# Patient Record
Sex: MALE | Race: WHITE
[De-identification: names, ages, dates, MRNs, and addresses within clinical notes are randomized per-mention and may not be internally consistent; named-entity substitution may affect disease eponyms.]

---

## 2017-05-12 ENCOUNTER — HOSPITAL ENCOUNTER (EMERGENCY)
Dept: HOSPITAL 17 - NEPD | Age: 42
LOS: 1 days | Discharge: PSYCHIATRIC OR SUBSTANCE USE FACILITY | End: 2017-05-13
Payer: SELF-PAY

## 2017-05-12 VITALS — HEIGHT: 69 IN | BODY MASS INDEX: 20.57 KG/M2 | WEIGHT: 138.89 LBS

## 2017-05-12 VITALS
SYSTOLIC BLOOD PRESSURE: 110 MMHG | HEART RATE: 94 BPM | OXYGEN SATURATION: 99 % | DIASTOLIC BLOOD PRESSURE: 67 MMHG | TEMPERATURE: 98.3 F | RESPIRATION RATE: 20 BRPM

## 2017-05-12 VITALS
TEMPERATURE: 98.1 F | RESPIRATION RATE: 16 BRPM | SYSTOLIC BLOOD PRESSURE: 94 MMHG | OXYGEN SATURATION: 95 % | HEART RATE: 83 BPM | DIASTOLIC BLOOD PRESSURE: 60 MMHG

## 2017-05-12 DIAGNOSIS — F41.8: ICD-10-CM

## 2017-05-12 DIAGNOSIS — F12.90: ICD-10-CM

## 2017-05-12 DIAGNOSIS — R45.851: Primary | ICD-10-CM

## 2017-05-12 DIAGNOSIS — F14.90: ICD-10-CM

## 2017-05-12 DIAGNOSIS — B19.20: ICD-10-CM

## 2017-05-12 LAB
ALP SERPL-CCNC: 123 U/L (ref 45–117)
ALT SERPL-CCNC: 101 U/L (ref 12–78)
AMPHETAMINE, URINE: (no result)
ANION GAP SERPL CALC-SCNC: 12 MEQ/L (ref 5–15)
APAP SERPL-MCNC: (no result) MCG/ML (ref 10–30)
AST SERPL-CCNC: 86 U/L (ref 15–37)
BARBITURATES, URINE: (no result)
BASOPHILS # BLD AUTO: 0 TH/MM3 (ref 0–0.2)
BASOPHILS NFR BLD: 0.7 % (ref 0–2)
BILIRUB SERPL-MCNC: 0.4 MG/DL (ref 0.2–1)
BUN SERPL-MCNC: 7 MG/DL (ref 7–18)
CHLORIDE SERPL-SCNC: 104 MEQ/L (ref 98–107)
COCAINE UR-MCNC: (no result) NG/ML
EOSINOPHIL # BLD: 0 TH/MM3 (ref 0–0.4)
EOSINOPHIL NFR BLD: 0.2 % (ref 0–4)
ERYTHROCYTE [DISTWIDTH] IN BLOOD BY AUTOMATED COUNT: 13.5 % (ref 11.6–17.2)
GFR SERPLBLD BASED ON 1.73 SQ M-ARVRAT: 86 ML/MIN (ref 89–?)
HCO3 BLD-SCNC: 24.4 MEQ/L (ref 21–32)
HCT VFR BLD CALC: 47.8 % (ref 39–51)
HEMO FLAGS: (no result)
LYMPHOCYTES # BLD AUTO: 0.8 TH/MM3 (ref 1–4.8)
LYMPHOCYTES NFR BLD AUTO: 10.5 % (ref 9–44)
MCH RBC QN AUTO: 31.2 PG (ref 27–34)
MCHC RBC AUTO-ENTMCNC: 33.7 % (ref 32–36)
MCV RBC AUTO: 92.7 FL (ref 80–100)
MONOCYTES NFR BLD: 5.9 % (ref 0–8)
NEUTROPHILS # BLD AUTO: 6.1 TH/MM3 (ref 1.8–7.7)
NEUTROPHILS NFR BLD AUTO: 82.7 % (ref 16–70)
PLATELET # BLD: 230 TH/MM3 (ref 150–450)
POTASSIUM SERPL-SCNC: 3.8 MEQ/L (ref 3.5–5.1)
RBC # BLD AUTO: 5.16 MIL/MM3 (ref 4.5–5.9)
SODIUM SERPL-SCNC: 140 MEQ/L (ref 136–145)
WBC # BLD AUTO: 7.4 TH/MM3 (ref 4–11)

## 2017-05-12 PROCEDURE — 85025 COMPLETE CBC W/AUTO DIFF WBC: CPT

## 2017-05-12 PROCEDURE — 99284 EMERGENCY DEPT VISIT MOD MDM: CPT

## 2017-05-12 PROCEDURE — 80307 DRUG TEST PRSMV CHEM ANLYZR: CPT

## 2017-05-12 PROCEDURE — 96374 THER/PROPH/DIAG INJ IV PUSH: CPT

## 2017-05-12 PROCEDURE — 80053 COMPREHEN METABOLIC PANEL: CPT

## 2017-05-12 NOTE — PD
HPI


.


41 YOWM presents to the ed with c/o feelings of hopelessness & suicidal 

ideation after having financial troubles over the last week. He reports his 

symptoms worsened today after drinking a pint of vodka & smoking cocaine. He 

reports he has a plan to cut his wrist or shot himself with a gun. He reports 

past medical Hx of depression, Hep C & alcohol abuse. Patient reports seizures 

in the past with ETOH withdrawal. He reports is last drink was 5 hours ago. He 

is requesting help with his ETOH abuse & psychiatric problems. He denies any CP

, SOB, N/V, or abdominal pain.


Chief Complaint:  Suicide Ideation/Attempt


Time Seen by Provider:  18:45


Travel History


International Travel<30 days:  No


Contact w/Intl Traveler<30days:  No


Traveled to known affect area:  No





History of Present Illness


HPI


41 YOWM presents to the ed with c/o feelings of hopelessness & suicidal 

ideation after having financial troubles over the last week. He reports his 

symptoms worsened today after drinking a pint of vodka & smoking cocaine. He 

reports he has a plan to cut his wrist or shot himself with a gun. He reports 

past medical Hx of depression, Hep C & alcohol abuse. Patient reports seizures 

in the past with ETOH withdrawal. He reports is last drink was 5 hours ago. He 

is requesting help with his ETOH abuse & psychiatric problems. He denies any CP

, SOB, N/V, or abdominal pain.





PFSH


Past Medical History


Asthma:  Yes


Blood Disorders:  No


Anxiety:  Yes


Depression:  Yes


Heart Rhythm Problems:  No


Cancer:  No


Cardiovascular Problems:  No


High Cholesterol:  No


Chemotherapy:  No


Chest Pain:  No


Congestive Heart Failure:  No


COPD:  No


Cerebrovascular Accident:  No


Diabetes:  No


Diminished Hearing:  No


Endocrine:  No


Gastrointestinal Disorders:  No


Genitourinary:  No


Hepatitis:  Yes (C)


Hypertension:  No


Immune Disorder:  No


Implanted Vascular Access Dvce:  Yes


Musculoskeletal:  No


Neurologic:  No


Psychiatric:  Yes


Reproductive:  No


Respiratory:  Yes


Immunizations Current:  Yes


Myocardial Infarction:  No


Radiation Therapy:  No


Renal Failure:  No


Seizures:  No


Sleep Apnea:  No


Thyroid Disease:  No


Ulcer:  No





Past Surgical History


Abdominal Surgery:  Yes (HERNIA REPAIR)


Body Medical Devices:  L FOOT, SCREWS, PLATE, QUAN


Cardiac Surgery:  No


Ear Surgery:  No


Endocrine Surgery:  No


Eye Surgery:  No


Genitourinary Surgery:  No


Neurologic Surgery:  No


Oral Surgery:  No


Pacemaker:  No


Thoracic Surgery:  No


Tonsillectomy:  Yes


Other Surgery:  Yes (undecented testicle,tonsillectomy,2 hernia. 20 screws on 

left foot. plate)





Social History


Alcohol Use:  Yes (drinks 1 Liter Vodka daily)


Tobacco Use:  Yes


Substance Use:  Yes (Cocaine, pot)





Allergies-Medications


(Allergen,Severity, Reaction):  


Coded Allergies:  


     Sulfa (Verified  Allergy, Severe, Rash, 5/12/17)


 Per pt.


Uncoded Allergies:  


     Dust Mites (Allergy, Unknown, 3/1/16)


 Per pt.


Reported Meds & Prescriptions





Reported Meds & Active Scripts


Active


No Active Prescriptions or Reported Medications    








Review of Systems


Except as stated in HPI:  all other systems reviewed are Neg


Psychiatric:  Positive: Depression, Suicidal Ideations





Physical Exam


Narrative


GENERAL: Alert & oriented , no acute distress, non-tremulous 


SKIN: Warm and dry.


HEAD: Normocephalic.


EYES: No scleral icterus. No injection or drainage. 


NECK: Supple, trachea midline. No JVD or lymphadenopathy.


CARDIOVASCULAR: Regular rate and rhythm without murmurs, gallops, or rubs. 


RESPIRATORY: Breath sounds equal bilaterally. No accessory muscle use.


GASTROINTESTINAL: Abdomen soft, non-tender, nondistended. 


MUSCULOSKELETAL: No cyanosis, or edema. 


BACK: Nontender without obvious deformity. No CVA tenderness.


Psych: Cooperative, reporting suicidal ideation & depression








Data


Data


Last Documented VS





Vital Signs








  Date Time  Temp Pulse Resp B/P Pulse Ox O2 Delivery O2 Flow Rate FiO2


 


5/13/17 02:57  107 18 113/79 96   


 


5/12/17 22:35 98.1     Room Air  








Orders





 Complete Blood Count With Diff (5/12/17 18:08)


Comprehensive Metabolic Panel (5/12/17 18:08)


Psych Screen (5/12/17 18:08)


Drug Screen, Random Urine (5/12/17 18:08)


Alcohol (Ethanol) (5/12/17 18:38)


Salicylates (Aspirin) (5/12/17 18:38)


Tylenol (Acetaminophen) (5/12/17 18:38)


Alcohol Withdrawal Asmt-Ciwa ONCE (5/12/17 18:53)


Flumazenil Inj (Romazicon Inj) (5/12/17 19:00)


Lorazepam (Ativan) (5/12/17 19:00)


Lorazepam Inj (Ativan Inj) (5/12/17 19:00)


Lorazepam (Ativan) (5/12/17 19:00)


Lorazepam Inj (Ativan Inj) (5/12/17 19:00)





Labs





 Laboratory Tests








Test 5/12/17 5/12/17





 18:10 19:15


 


White Blood Count 7.4 TH/MM3 


 


Red Blood Count 5.16 MIL/MM3 


 


Hemoglobin 16.1 GM/DL 


 


Hematocrit 47.8 % 


 


Mean Corpuscular Volume 92.7 FL 


 


Mean Corpuscular Hemoglobin 31.2 PG 


 


Mean Corpuscular Hemoglobin 33.7 % 





Concent  


 


Red Cell Distribution Width 13.5 % 


 


Platelet Count 230 TH/MM3 


 


Mean Platelet Volume 8.4 FL 


 


Neutrophils (%) (Auto) 82.7 % 


 


Lymphocytes (%) (Auto) 10.5 % 


 


Monocytes (%) (Auto) 5.9 % 


 


Eosinophils (%) (Auto) 0.2 % 


 


Basophils (%) (Auto) 0.7 % 


 


Neutrophils # (Auto) 6.1 TH/MM3 


 


Lymphocytes # (Auto) 0.8 TH/MM3 


 


Monocytes # (Auto) 0.4 TH/MM3 


 


Eosinophils # (Auto) 0.0 TH/MM3 


 


Basophils # (Auto) 0.0 TH/MM3 


 


CBC Comment DIFF FINAL  


 


Differential Comment   


 


Sodium Level 140 MEQ/L 


 


Potassium Level 3.8 MEQ/L 


 


Chloride Level 104 MEQ/L 


 


Carbon Dioxide Level 24.4 MEQ/L 


 


Anion Gap 12 MEQ/L 


 


Blood Urea Nitrogen 7 MG/DL 


 


Creatinine 0.97 MG/DL 


 


Estimat Glomerular Filtration 86 ML/MIN 





Rate  


 


Random Glucose 88 MG/DL 


 


Calcium Level 9.3 MG/DL 


 


Total Bilirubin 0.4 MG/DL 


 


Aspartate Amino Transf 86 U/L 





(AST/SGOT)  


 


Alanine Aminotransferase 101 U/L 





(ALT/SGPT)  


 


Alkaline Phosphatase 123 U/L 


 


Total Protein 9.5 GM/DL 


 


Albumin 4.5 GM/DL 


 


Urine Opiates Screen NEG  


 


Urine Barbiturates Screen NEG  


 


Urine Amphetamines Screen NEG  


 


Urine Benzodiazepines Screen NEG  


 


Urine Cocaine Screen POS  


 


Urine Cannabinoids Screen POS  


 


Salicylates Level  4.6 MG/DL


 


Acetaminophen Level  LESS THAN 2.0





  MCG/ML


 


Ethyl Alcohol Level  177 MG/DL











MDM


Medical Decision Making


Medical Screen Exam Complete:  Yes


Emergency Medical Condition:  Yes


Medical Record Reviewed:  Yes


Differential Diagnosis


suicidal ideation vs. depression vs polysubstance abuse


Narrative Course


41 YOM presents with suicidal ideation & requesting ETOH detox. Patients last 

drink was 5 hours prior. He reports seizures in past with previous ETOH 

withdrawal. 





[facility] protocol initiated, patient will be baker acted & once medically cleared 

he will receive psych evaluation.





Patients labs reviewed only pertinent findings were mild elevation of liver 

enzymes, not of true concern given the Hx of Hep C.  ETOH level 177, UDS + 

cocaine & THC. Patient remains stable. He is medically cleared & awaiting psych 

evaluation.





Diagnosis





 Primary Impression:  


 Suicidal ideation


 Additional Impressions:  


 Abuse, drug or alcohol


 Depression


 Qualified Code:  F32.9 - Depression, unspecified depression type


Scripts


No Active Prescriptions or Reported Meds








Nicole Schaefer May 12, 2017 18:48

## 2017-05-13 VITALS
DIASTOLIC BLOOD PRESSURE: 79 MMHG | HEART RATE: 107 BPM | OXYGEN SATURATION: 96 % | SYSTOLIC BLOOD PRESSURE: 113 MMHG | RESPIRATION RATE: 18 BRPM

## 2017-05-28 ENCOUNTER — HOSPITAL ENCOUNTER (EMERGENCY)
Dept: HOSPITAL 17 - NEPC | Age: 42
LOS: 1 days | Discharge: HOME | End: 2017-05-29
Payer: SELF-PAY

## 2017-05-28 VITALS — HEIGHT: 70 IN | WEIGHT: 134.48 LBS | BODY MASS INDEX: 19.25 KG/M2

## 2017-05-28 VITALS
DIASTOLIC BLOOD PRESSURE: 73 MMHG | RESPIRATION RATE: 16 BRPM | OXYGEN SATURATION: 95 % | HEART RATE: 100 BPM | SYSTOLIC BLOOD PRESSURE: 109 MMHG | TEMPERATURE: 98.7 F

## 2017-05-28 DIAGNOSIS — Z23: ICD-10-CM
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
## 2017-05-28 DIAGNOSIS — Z86.59: ICD-10-CM

## 2017-05-28 DIAGNOSIS — S01.01XA: Primary | ICD-10-CM

## 2017-05-28 DIAGNOSIS — Z86.19: ICD-10-CM

## 2017-05-28 DIAGNOSIS — R79.89: ICD-10-CM

## 2017-05-28 DIAGNOSIS — Z87.09: ICD-10-CM

## 2017-05-28 DIAGNOSIS — F17.200: ICD-10-CM

## 2017-05-28 DIAGNOSIS — F10.10: ICD-10-CM

## 2017-05-28 PROCEDURE — 99285 EMERGENCY DEPT VISIT HI MDM: CPT

## 2017-05-28 PROCEDURE — 72125 CT NECK SPINE W/O DYE: CPT

## 2017-05-28 PROCEDURE — 90714 TD VACC NO PRESV 7 YRS+ IM: CPT

## 2017-05-28 PROCEDURE — 70486 CT MAXILLOFACIAL W/O DYE: CPT

## 2017-05-28 PROCEDURE — 70450 CT HEAD/BRAIN W/O DYE: CPT

## 2017-05-28 PROCEDURE — 96375 TX/PRO/DX INJ NEW DRUG ADDON: CPT

## 2017-05-28 PROCEDURE — 96374 THER/PROPH/DIAG INJ IV PUSH: CPT

## 2017-05-28 PROCEDURE — 85610 PROTHROMBIN TIME: CPT

## 2017-05-28 PROCEDURE — 80307 DRUG TEST PRSMV CHEM ANLYZR: CPT

## 2017-05-28 PROCEDURE — 85025 COMPLETE CBC W/AUTO DIFF WBC: CPT

## 2017-05-28 PROCEDURE — 81001 URINALYSIS AUTO W/SCOPE: CPT

## 2017-05-28 PROCEDURE — 80053 COMPREHEN METABOLIC PANEL: CPT

## 2017-05-28 PROCEDURE — 12011 RPR F/E/E/N/L/M 2.5 CM/<: CPT

## 2017-05-28 PROCEDURE — 90471 IMMUNIZATION ADMIN: CPT

## 2017-05-28 PROCEDURE — 85730 THROMBOPLASTIN TIME PARTIAL: CPT
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
## 2017-05-28 NOTE — PD
Physical Exam


Date Seen by Provider:  May 28, 2017


Time Seen by Provider:  23:45


Narrative


For full history and physical examination please see previous provider's note.  

I was asked to repair laceration to forehead.





Data


Data


Last Documented VS





Vital Signs








  Date Time  Temp Pulse Resp B/P Pulse Ox O2 Delivery O2 Flow Rate FiO2


 


5/28/17 22:29 98.7 100 16 109/73 95   








Orders





 Ct Brain W/O Iv Contrast(Rout) (5/28/17 22:37)


Ct Cerv Spine W/O Contrast (5/28/17 22:37)


Ct Facial Bones W/O Iv Cont (5/28/17 22:37)


Complete Blood Count With Diff (5/28/17 22:37)


Comprehensive Metabolic Panel (5/28/17 22:37)


Prothrombin Time / Inr (Pt) (5/28/17 22:37)


Act Partial Throm Time (Ptt) (5/28/17 22:37)


Urinalysis - C+S If Indicated (5/28/17 22:37)


Iv Access Insert/Monitor (5/28/17 22:37)


Ecg Monitoring (5/28/17 22:37)


Oximetry (5/28/17 22:37)


Sodium Chloride 0.9% Flush (Ns Flush) (5/28/17 22:45)


Alcohol (Ethanol) (5/28/17 22:37)


Drug Screen, Random Urine (5/28/17 22:37)


Tetanus/Diphtheria Tox Adult (Tetanus/Di (5/28/17 22:45)


Lidocai-Epi 1%-1:100,000 Inj (Xylocaine- (5/28/17 22:45)


Lidocaine 1% Inj (Xylocaine 1% Inj) (5/28/17 22:45)








[facility]


Medical Record Reviewed:  Yes


Supervised Visit with KURTIS:  Yes


Interpretation(s)





Last Impressions








Maxillofacial CT 5/28/17 2237 Signed





Impressions: 





 Service Date/Time:  Von, May 28, 2017 22:53 - CONCLUSION:  No evidence of 





 facial fracture     Cooper Amezcua MD 


 


Head CT 5/28/17 2237 Signed





Impressions: 





 Service Date/Time:  Von, May 28, 2017 22:53 - CONCLUSION:  Normal 





 examination.       Cooper Amezcua MD 








Vital Signs








  Date Time  Temp Pulse Resp B/P Pulse Ox O2 Delivery O2 Flow Rate FiO2


 


5/28/17 22:29 98.7 100 16 109/73 95   








Procedures


**Procedure Narrative**


LACERATION


LOCATION: Left forehead


LENGTH: 2 cm


NUMBER OF STITCHES/STAPLES: 8 stitches





REPAIR: The area of the laceration was prepped with Betadine and sterilely 

draped.  The laceration was infiltrated with  


1% lidocaine with epi.  The wound was copiously irrigated and explored without 

evidence of foreign body, tendon injury or neurovascular  


injury.  The wound was closed using 4-0 Prolene. This was a 1 layer repair. A 

sterile dressing was applied. The patient was  


advised to keep the dressing clean and dry. Patient tolerated the procedure 

well.


Scripts


No Active Prescriptions or Reported Meds


Condition:  Bonita Valero May 28, 2017 23:46

## 2017-05-29 VITALS
HEART RATE: 104 BPM | SYSTOLIC BLOOD PRESSURE: 110 MMHG | OXYGEN SATURATION: 98 % | DIASTOLIC BLOOD PRESSURE: 70 MMHG | RESPIRATION RATE: 21 BRPM

## 2017-05-29 VITALS
SYSTOLIC BLOOD PRESSURE: 102 MMHG | RESPIRATION RATE: 15 BRPM | OXYGEN SATURATION: 99 % | DIASTOLIC BLOOD PRESSURE: 79 MMHG | HEART RATE: 81 BPM

## 2017-05-29 VITALS — SYSTOLIC BLOOD PRESSURE: 118 MMHG | DIASTOLIC BLOOD PRESSURE: 78 MMHG

## 2017-05-29 LAB
ALP SERPL-CCNC: 83 U/L (ref 45–117)
ALT SERPL-CCNC: 113 U/L (ref 12–78)
AMPHETAMINE, URINE: (no result)
ANION GAP SERPL CALC-SCNC: 13 MEQ/L (ref 5–15)
APTT BLD: 31.3 SEC (ref 24.3–30.1)
AST SERPL-CCNC: 132 U/L (ref 15–37)
BARBITURATES, URINE: (no result)
BASOPHILS # BLD AUTO: 0.1 TH/MM3 (ref 0–0.2)
BASOPHILS NFR BLD: 1 % (ref 0–2)
BILIRUB SERPL-MCNC: 0.3 MG/DL (ref 0.2–1)
BUN SERPL-MCNC: 14 MG/DL (ref 7–18)
CHLORIDE SERPL-SCNC: 106 MEQ/L (ref 98–107)
COCAINE UR-MCNC: (no result) NG/ML
COLOR UR: YELLOW
COMMENT (UR): (no result)
CULTURE IF INDICATED: (no result)
EOSINOPHIL # BLD: 0 TH/MM3 (ref 0–0.4)
EOSINOPHIL NFR BLD: 0.6 % (ref 0–4)
ERYTHROCYTE [DISTWIDTH] IN BLOOD BY AUTOMATED COUNT: 12.8 % (ref 11.6–17.2)
GFR SERPLBLD BASED ON 1.73 SQ M-ARVRAT: 112 ML/MIN (ref 89–?)
GLUCOSE UR STRIP-MCNC: (no result) MG/DL
HCO3 BLD-SCNC: 27.3 MEQ/L (ref 21–32)
HCT VFR BLD CALC: 37.3 % (ref 39–51)
HEMO FLAGS: (no result)
HGB UR QL STRIP: (no result)
INR PPP: 1 RATIO
KETONES UR STRIP-MCNC: (no result) MG/DL
LYMPHOCYTES # BLD AUTO: 1.4 TH/MM3 (ref 1–4.8)
LYMPHOCYTES NFR BLD AUTO: 23.1 % (ref 9–44)
MCH RBC QN AUTO: 31.5 PG (ref 27–34)
MCHC RBC AUTO-ENTMCNC: 33.9 % (ref 32–36)
MCV RBC AUTO: 92.9 FL (ref 80–100)
MONOCYTES NFR BLD: 8 % (ref 0–8)
MUCOUS THREADS #/AREA URNS LPF: (no result) /LPF
NEUTROPHILS # BLD AUTO: 4.1 TH/MM3 (ref 1.8–7.7)
NEUTROPHILS NFR BLD AUTO: 67.3 % (ref 16–70)
NITRITE UR QL STRIP: (no result)
PLATELET # BLD: 130 TH/MM3 (ref 150–450)
POTASSIUM SERPL-SCNC: 3.4 MEQ/L (ref 3.5–5.1)
PROTHROMBIN TIME: 10.7 SEC (ref 9.8–11.6)
RBC # BLD AUTO: 4.01 MIL/MM3 (ref 4.5–5.9)
SODIUM SERPL-SCNC: 146 MEQ/L (ref 136–145)
SP GR UR STRIP: 1.03 (ref 1–1.03)
WBC # BLD AUTO: 6 TH/MM3 (ref 4–11)

## 2017-05-30 ENCOUNTER — HOSPITAL ENCOUNTER (EMERGENCY)
Dept: HOSPITAL 17 - NEPC | Age: 42
Discharge: HOME | End: 2017-05-30
Payer: SELF-PAY

## 2017-05-30 VITALS
TEMPERATURE: 98.1 F | SYSTOLIC BLOOD PRESSURE: 115 MMHG | DIASTOLIC BLOOD PRESSURE: 70 MMHG | OXYGEN SATURATION: 99 % | RESPIRATION RATE: 18 BRPM | HEART RATE: 64 BPM

## 2017-05-30 VITALS — WEIGHT: 143.3 LBS | BODY MASS INDEX: 20.52 KG/M2 | HEIGHT: 70 IN

## 2017-05-30 VITALS
OXYGEN SATURATION: 96 % | SYSTOLIC BLOOD PRESSURE: 104 MMHG | DIASTOLIC BLOOD PRESSURE: 68 MMHG | RESPIRATION RATE: 15 BRPM | HEART RATE: 87 BPM

## 2017-05-30 VITALS — OXYGEN SATURATION: 100 %

## 2017-05-30 DIAGNOSIS — F10.129: ICD-10-CM

## 2017-05-30 DIAGNOSIS — B19.20: ICD-10-CM

## 2017-05-30 DIAGNOSIS — F19.10: Primary | ICD-10-CM

## 2017-05-30 DIAGNOSIS — R53.1: ICD-10-CM

## 2017-05-30 DIAGNOSIS — F17.200: ICD-10-CM

## 2017-05-30 DIAGNOSIS — S01.81XD: ICD-10-CM
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
## 2017-05-30 LAB
ALP SERPL-CCNC: 88 U/L (ref 45–117)
ALT SERPL-CCNC: 87 U/L (ref 12–78)
AMPHETAMINE, URINE: (no result)
ANION GAP SERPL CALC-SCNC: 9 MEQ/L (ref 5–15)
APAP SERPL-MCNC: (no result) MCG/ML (ref 10–30)
APTT BLD: 30.8 SEC (ref 24.3–30.1)
AST SERPL-CCNC: 96 U/L (ref 15–37)
BARBITURATES, URINE: (no result)
BASOPHILS # BLD AUTO: 0 TH/MM3 (ref 0–0.2)
BASOPHILS NFR BLD: 0.9 % (ref 0–2)
BILIRUB SERPL-MCNC: 0.3 MG/DL (ref 0.2–1)
BUN SERPL-MCNC: 8 MG/DL (ref 7–18)
CHLORIDE SERPL-SCNC: 107 MEQ/L (ref 98–107)
COCAINE UR-MCNC: (no result) NG/ML
COLOR UR: YELLOW
COMMENT (UR): (no result)
CULTURE IF INDICATED: (no result)
EOSINOPHIL # BLD: 0 TH/MM3 (ref 0–0.4)
EOSINOPHIL NFR BLD: 0.3 % (ref 0–4)
ERYTHROCYTE [DISTWIDTH] IN BLOOD BY AUTOMATED COUNT: 12.5 % (ref 11.6–17.2)
GFR SERPLBLD BASED ON 1.73 SQ M-ARVRAT: 147 ML/MIN (ref 89–?)
GLUCOSE UR STRIP-MCNC: (no result) MG/DL
HCO3 BLD-SCNC: 26.1 MEQ/L (ref 21–32)
HCT VFR BLD CALC: 35 % (ref 39–51)
HEMO FLAGS: (no result)
HGB UR QL STRIP: (no result)
INR PPP: 1 RATIO
KETONES UR STRIP-MCNC: 10 MG/DL
LYMPHOCYTES # BLD AUTO: 1.2 TH/MM3 (ref 1–4.8)
LYMPHOCYTES NFR BLD AUTO: 29.6 % (ref 9–44)
MAGNESIUM SERPL-MCNC: 1.6 MG/DL (ref 1.5–2.5)
MCH RBC QN AUTO: 31.7 PG (ref 27–34)
MCHC RBC AUTO-ENTMCNC: 33.6 % (ref 32–36)
MCV RBC AUTO: 94.4 FL (ref 80–100)
MONOCYTES NFR BLD: 8.2 % (ref 0–8)
MUCOUS THREADS #/AREA URNS LPF: (no result) /LPF
NEUTROPHILS # BLD AUTO: 2.4 TH/MM3 (ref 1.8–7.7)
NEUTROPHILS NFR BLD AUTO: 61 % (ref 16–70)
NITRITE UR QL STRIP: (no result)
PLATELET # BLD: 108 TH/MM3 (ref 150–450)
POTASSIUM SERPL-SCNC: 3.7 MEQ/L (ref 3.5–5.1)
PROTHROMBIN TIME: 11 SEC (ref 9.8–11.6)
RBC # BLD AUTO: 3.7 MIL/MM3 (ref 4.5–5.9)
SODIUM SERPL-SCNC: 142 MEQ/L (ref 136–145)
SP GR UR STRIP: 1.02 (ref 1–1.03)
SQUAMOUS #/AREA URNS HPF: <1 /HPF (ref 0–5)
WBC # BLD AUTO: 4 TH/MM3 (ref 4–11)

## 2017-05-30 PROCEDURE — 96361 HYDRATE IV INFUSION ADD-ON: CPT

## 2017-05-30 PROCEDURE — 96365 THER/PROPH/DIAG IV INF INIT: CPT

## 2017-05-30 PROCEDURE — 80307 DRUG TEST PRSMV CHEM ANLYZR: CPT

## 2017-05-30 PROCEDURE — 99284 EMERGENCY DEPT VISIT MOD MDM: CPT

## 2017-05-30 PROCEDURE — 83735 ASSAY OF MAGNESIUM: CPT

## 2017-05-30 PROCEDURE — 85730 THROMBOPLASTIN TIME PARTIAL: CPT

## 2017-05-30 PROCEDURE — 85025 COMPLETE CBC W/AUTO DIFF WBC: CPT

## 2017-05-30 PROCEDURE — 81001 URINALYSIS AUTO W/SCOPE: CPT

## 2017-05-30 PROCEDURE — 80053 COMPREHEN METABOLIC PANEL: CPT

## 2017-05-30 PROCEDURE — 85610 PROTHROMBIN TIME: CPT

## 2017-05-30 PROCEDURE — 70450 CT HEAD/BRAIN W/O DYE: CPT
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
## 2017-05-30 NOTE — PD
Physical Exam


Narrative


GENERAL: Well-nourished, well-developed patient.


SKIN: Warm and dry.


HEAD: Normocephalic and left forehead noted with sutures in place from 

laceration


EYES: No injection or drainage. 


ENT: No nasal drainage noted. 


NECK: Supple, trachea midline.  Nontender to palpation in midline


CARDIOVASCULAR: Regular rate and rhythm 


RESPIRATORY: No increased effort. No accessory muscle use.


NEUROLOGICAL: Awake and alert. Motor and sensory grossly within normal limits. 

Normal speech.





Data


Data


Last Documented VS





Vital Signs








  Date Time  Temp Pulse Resp B/P Pulse Ox O2 Delivery O2 Flow Rate FiO2


 


5/30/17 07:15  87 15 104/68 96 Room Air  


 


5/30/17 03:38 98.1       








Orders





 Complete Blood Count With Diff (5/30/17 03:55)


Comprehensive Metabolic Panel (5/30/17 03:55)


Prothrombin Time / Inr (Pt) (5/30/17 03:55)


Act Partial Throm Time (Ptt) (5/30/17 03:55)


Urinalysis - C+S If Indicated (5/30/17 03:55)


Magnesium (Mg) (5/30/17 03:55)


Iv Access Insert/Monitor (5/30/17 03:55)


Ecg Monitoring (5/30/17 03:55)


Oximetry (5/30/17 03:55)


Drug Screen, Random Urine (5/30/17 03:55)


Alcohol (Ethanol) (5/30/17 03:55)


Salicylates (Aspirin) (5/30/17 03:55)


Tylenol (Acetaminophen) (5/30/17 03:55)


Sodium Chlor 0.9% 1000 Ml Inj (Ns 1000 M (5/30/17 04:00)


Thiamine Inj (Thiamine Inj) (5/30/17 04:00)


Ct Brain W/O Iv Contrast(Rout) (5/30/17 )





Labs





 Laboratory Tests








Test 5/30/17





 04:11


 


White Blood Count 4.0 TH/MM3


 


Red Blood Count 3.70 MIL/MM3


 


Hemoglobin 11.7 GM/DL


 


Hematocrit 35.0 %


 


Mean Corpuscular Volume 94.4 FL


 


Mean Corpuscular Hemoglobin 31.7 PG


 


Mean Corpuscular Hemoglobin 33.6 %





Concent 


 


Red Cell Distribution Width 12.5 %


 


Platelet Count 108 TH/MM3


 


Mean Platelet Volume 8.8 FL


 


Neutrophils (%) (Auto) 61.0 %


 


Lymphocytes (%) (Auto) 29.6 %


 


Monocytes (%) (Auto) 8.2 %


 


Eosinophils (%) (Auto) 0.3 %


 


Basophils (%) (Auto) 0.9 %


 


Neutrophils # (Auto) 2.4 TH/MM3


 


Lymphocytes # (Auto) 1.2 TH/MM3


 


Monocytes # (Auto) 0.3 TH/MM3


 


Eosinophils # (Auto) 0.0 TH/MM3


 


Basophils # (Auto) 0.0 TH/MM3


 


CBC Comment DIFF FINAL 


 


Differential Comment  


 


Prothrombin Time 11.0 SEC


 


Prothromb Time International 1.0 RATIO





Ratio 


 


Activated Partial 30.8 SEC





Thromboplast Time 


 


Urine Color YELLOW 


 


Urine Turbidity CLEAR 


 


Urine pH 5.0 


 


Urine Specific Gravity 1.017 


 


Urine Protein NEG mg/dL


 


Urine Glucose (UA) NEG mg/dL


 


Urine Ketones 10 mg/dL


 


Urine Occult Blood SMALL 


 


Urine Nitrite NEG 


 


Urine Bilirubin NEG 


 


Urine Urobilinogen LESS THAN 2.0





 MG/DL


 


Urine Leukocyte Esterase NEG 


 


Urine RBC 2 /hpf


 


Urine WBC LESS THAN 1





 /hpf


 


Urine Squamous Epithelial <1 /hpf





Cells 


 


Urine Mucus FEW /lpf


 


Microscopic Urinalysis Comment CULT NOT





 INDICATED


 


Sodium Level 142 MEQ/L


 


Potassium Level 3.7 MEQ/L


 


Chloride Level 107 MEQ/L


 


Carbon Dioxide Level 26.1 MEQ/L


 


Anion Gap 9 MEQ/L


 


Blood Urea Nitrogen 8 MG/DL


 


Creatinine 0.61 MG/DL


 


Estimat Glomerular Filtration 147 ML/MIN





Rate 


 


Random Glucose 71 MG/DL


 


Calcium Level 8.1 MG/DL


 


Magnesium Level 1.6 MG/DL


 


Total Bilirubin 0.3 MG/DL


 


Aspartate Amino Transf 96 U/L





(AST/SGOT) 


 


Alanine Aminotransferase 87 U/L





(ALT/SGPT) 


 


Alkaline Phosphatase 88 U/L


 


Total Protein 6.8 GM/DL


 


Albumin 3.4 GM/DL


 


Salicylates Level 2.4 MG/DL


 


Urine Opiates Screen NEG 


 


Acetaminophen Level LESS THAN 2.0





 MCG/ML


 


Urine Barbiturates Screen NEG 


 


Urine Amphetamines Screen NEG 


 


Urine Benzodiazepines Screen POS 


 


Urine Cocaine Screen POS 


 


Urine Cannabinoids Screen NEG 


 


Ethyl Alcohol Level 262 MG/DL











[facility]


Supervised Visit with KURTIS:  No


Interpretation(s)


ct head no acute


Narrative Course


Nursing staff states patient is noting headache.  On review of records patient 

has had no imaging today and has had recent trauma.  Patient is now sober and 

this is a new complaint.  Will add on CT brain to rule out underlying injury 

and if this is negative he will be discharged home.





ct negative, no new complaints, advised to stop drug use and limit alcohol


Diagnosis





 Primary Impression:  


 Polysubstance abuse


 Additional Impression:  


 Alcohol intoxication


 Qualified Code:  F10.929 - Alcohol intoxication, with unspecified complication


Referrals:  


Primary Care Physician


2 days








[facility]


2 days


Patient Instructions:  General Instructions, Polysubstance Abuse (ED)





***Additional Instruction:


return as needed, tylenol as needed


***Med/Other Pt SpecificInfo:  No Change to Meds


Scripts


No Active Prescriptions or Reported Meds


Disposition:  01 DISCHARGE HOME


Condition:  Stable








Deya Hicks MD May 30, 2017 08:13
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
## 2017-10-24 ENCOUNTER — HOSPITAL ENCOUNTER (EMERGENCY)
Dept: HOSPITAL 17 - NEPK | Age: 42
Discharge: HOME | End: 2017-10-24
Payer: SELF-PAY

## 2017-10-24 VITALS — WEIGHT: 132.28 LBS | BODY MASS INDEX: 18.94 KG/M2 | HEIGHT: 70 IN

## 2017-10-24 VITALS
DIASTOLIC BLOOD PRESSURE: 63 MMHG | OXYGEN SATURATION: 96 % | RESPIRATION RATE: 16 BRPM | SYSTOLIC BLOOD PRESSURE: 131 MMHG | TEMPERATURE: 99.7 F | HEART RATE: 102 BPM

## 2017-10-24 DIAGNOSIS — L02.31: Primary | ICD-10-CM

## 2017-10-24 DIAGNOSIS — J45.909: ICD-10-CM

## 2017-10-24 DIAGNOSIS — Z72.0: ICD-10-CM

## 2017-10-24 DIAGNOSIS — E11.9: ICD-10-CM

## 2017-10-24 DIAGNOSIS — F32.9: ICD-10-CM

## 2017-10-24 DIAGNOSIS — F41.9: ICD-10-CM

## 2017-10-24 PROCEDURE — 99284 EMERGENCY DEPT VISIT MOD MDM: CPT
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
## 2018-04-10 ENCOUNTER — HOSPITAL ENCOUNTER (EMERGENCY)
Dept: HOSPITAL 17 - NEDAMB | Age: 43
LOS: 1 days | Discharge: HOME | End: 2018-04-11
Payer: COMMERCIAL

## 2018-04-10 VITALS
SYSTOLIC BLOOD PRESSURE: 120 MMHG | HEART RATE: 107 BPM | DIASTOLIC BLOOD PRESSURE: 89 MMHG | OXYGEN SATURATION: 97 % | TEMPERATURE: 98.6 F | RESPIRATION RATE: 12 BRPM

## 2018-04-10 VITALS — HEIGHT: 69 IN | BODY MASS INDEX: 21.55 KG/M2 | WEIGHT: 145.51 LBS

## 2018-04-10 DIAGNOSIS — E11.9: ICD-10-CM

## 2018-04-10 DIAGNOSIS — F41.9: ICD-10-CM

## 2018-04-10 DIAGNOSIS — F17.200: ICD-10-CM

## 2018-04-10 DIAGNOSIS — R45.851: Primary | ICD-10-CM

## 2018-04-10 DIAGNOSIS — F14.90: ICD-10-CM

## 2018-04-10 DIAGNOSIS — Z59.0: ICD-10-CM

## 2018-04-10 DIAGNOSIS — B19.20: ICD-10-CM

## 2018-04-10 DIAGNOSIS — F32.9: ICD-10-CM

## 2018-04-10 DIAGNOSIS — F19.10: ICD-10-CM

## 2018-04-10 LAB
ALBUMIN SERPL-MCNC: 4 GM/DL (ref 3.4–5)
ALP SERPL-CCNC: 132 U/L (ref 45–117)
ALT SERPL-CCNC: 132 U/L (ref 12–78)
APAP SERPL-MCNC: (no result) MCG/ML (ref 10–30)
AST SERPL-CCNC: 174 U/L (ref 15–37)
BASOPHILS # BLD AUTO: 0.1 TH/MM3 (ref 0–0.2)
BASOPHILS NFR BLD: 1.3 % (ref 0–2)
BILIRUB SERPL-MCNC: 0.3 MG/DL (ref 0.2–1)
BUN SERPL-MCNC: 12 MG/DL (ref 7–18)
CALCIUM SERPL-MCNC: 8.5 MG/DL (ref 8.5–10.1)
CHLORIDE SERPL-SCNC: 106 MEQ/L (ref 98–107)
CREAT SERPL-MCNC: 0.91 MG/DL (ref 0.6–1.3)
EOSINOPHIL # BLD: 0 TH/MM3 (ref 0–0.4)
EOSINOPHIL NFR BLD: 0.1 % (ref 0–4)
ERYTHROCYTE [DISTWIDTH] IN BLOOD BY AUTOMATED COUNT: 12.6 % (ref 11.6–17.2)
GFR SERPLBLD BASED ON 1.73 SQ M-ARVRAT: 92 ML/MIN (ref 89–?)
GLUCOSE SERPL-MCNC: 88 MG/DL (ref 74–106)
HCO3 BLD-SCNC: 26 MEQ/L (ref 21–32)
HCT VFR BLD CALC: 41.7 % (ref 39–51)
HGB BLD-MCNC: 14.7 GM/DL (ref 13–17)
LYMPHOCYTES # BLD AUTO: 1.3 TH/MM3 (ref 1–4.8)
LYMPHOCYTES NFR BLD AUTO: 29.1 % (ref 9–44)
MCH RBC QN AUTO: 31 PG (ref 27–34)
MCHC RBC AUTO-ENTMCNC: 35.1 % (ref 32–36)
MCV RBC AUTO: 88.4 FL (ref 80–100)
MONOCYTE #: 0.6 TH/MM3 (ref 0–0.9)
MONOCYTES NFR BLD: 12.6 % (ref 0–8)
NEUTROPHILS # BLD AUTO: 2.5 TH/MM3 (ref 1.8–7.7)
NEUTROPHILS NFR BLD AUTO: 56.9 % (ref 16–70)
PLATELET # BLD: 181 TH/MM3 (ref 150–450)
PMV BLD AUTO: 8.2 FL (ref 7–11)
PROT SERPL-MCNC: 8.9 GM/DL (ref 6.4–8.2)
RBC # BLD AUTO: 4.72 MIL/MM3 (ref 4.5–5.9)
SODIUM SERPL-SCNC: 143 MEQ/L (ref 136–145)
WBC # BLD AUTO: 4.4 TH/MM3 (ref 4–11)

## 2018-04-10 PROCEDURE — 85025 COMPLETE CBC W/AUTO DIFF WBC: CPT

## 2018-04-10 PROCEDURE — 80053 COMPREHEN METABOLIC PANEL: CPT

## 2018-04-10 PROCEDURE — 84443 ASSAY THYROID STIM HORMONE: CPT

## 2018-04-10 PROCEDURE — 99284 EMERGENCY DEPT VISIT MOD MDM: CPT

## 2018-04-10 PROCEDURE — 80307 DRUG TEST PRSMV CHEM ANLYZR: CPT

## 2018-04-10 SDOH — ECONOMIC STABILITY - HOUSING INSECURITY: HOMELESSNESS: Z59.0

## 2018-04-10 NOTE — PD
HPI


Chief Complaint:  Suicide Ideation/Attempt


Time Seen by Provider:  21:42


Travel History


International Travel<30 days:  No


Contact w/Intl Traveler<30days:  No


Traveled to known affect area:  No





History of Present Illness


HPI


42-year-old male that presents to the ED for evaluation of suicidal ideation.  

Patient was Brought here for [place] act by police secondary to systolic 

deviation.  Per patient he recently lost his job and he was recently in intermediate.  

Per patient he got out of intermediate and drank and went to work and then he was 

fired.  Per patient this happened about 3 days ago.  Patient has been using 

alcohol and cocaine in has been feeling very hopeless because of the whole 

situation.  He states that he feels suicidal but he is hoping to get help.  He 

denies any homicidal ideation.  No chest pain or shortness of breath.  No 

medical history other than pancreatitis secondary to alcohol.  Denies any pain 

at this time however.  Allergies to sulfa.  No urinary or bowel movement issues.





PFSH


Past Medical History


Asthma:  Yes


Blood Disorders:  No


Anxiety:  Yes


Depression:  Yes


Heart Rhythm Problems:  No


Cancer:  No


Cardiovascular Problems:  No


High Cholesterol:  No


Chemotherapy:  No


Chest Pain:  No


Congestive Heart Failure:  No


COPD:  No


Cerebrovascular Accident:  No


Diabetes:  Yes


Diminished Hearing:  No


Endocrine:  No


Gastrointestinal Disorders:  No


Genitourinary:  No


Hepatitis:  Yes (C)


Hypertension:  No


Immune Disorder:  No


Implanted Vascular Access Dvce:  Yes


Musculoskeletal:  No


Neurologic:  No


Psychiatric:  Yes


Reproductive:  No


Respiratory:  Yes


Immunizations Current:  Yes


Myocardial Infarction:  No


Radiation Therapy:  No


Renal Failure:  No


Seizures:  No


Sleep Apnea:  No


Thyroid Disease:  No


Ulcer:  No





Past Surgical History


Abdominal Surgery:  Yes (HERNIA REPAIR)


Body Medical Devices:  L FOOT, SCREWS, PLATE, QUAN


Cardiac Surgery:  No


Ear Surgery:  No


Endocrine Surgery:  No


Eye Surgery:  No


Genitourinary Surgery:  No


Neurologic Surgery:  No


Oral Surgery:  No


Pacemaker:  No


Thoracic Surgery:  No


Tonsillectomy:  Yes


Other Surgery:  Yes (undecented testicle,tonsillectomy,2 hernia. 20 screws on 

left foot. plate)





Social History


Alcohol Use:  Yes (DAILY)


Tobacco Use:  Yes (1PPD)


Substance Use:  Yes (COCAINE)





Allergies-Medications


(Allergen,Severity, Reaction):  


Coded Allergies:  


     Sulfa (Sulfonamide Antibiotics) (Unverified  Allergy, Severe, Rash, 10/30/

17)


 Per pt.


Uncoded Allergies:  


     Dust Mites (Allergy, Unknown, 3/1/16)


 Per pt.


Reported Meds & Prescriptions





Reported Meds & Active Scripts


Active


Mupirocin Topical (Mupirocin) 2 % Oint 1 Applic TOPICAL BID


Cleocin (Clindamycin HCl) 150 Mg Cap 300 Mg PO Q6H 10 Days








Review of Systems


Except as stated in HPI:  all other systems reviewed are Neg





Physical Exam


Narrative


GENERAL: 


SKIN: Warm and dry.


HEAD: Atraumatic. Normocephalic. 


EYES: Pupils equal and round. No scleral icterus. No injection or drainage. 


ENT: No nasal bleeding or discharge.  Mucous membranes pink and moist.  Tongue 

is midline.  No uvula deviation.


NECK: Trachea midline. No JVD. 


CARDIOVASCULAR: Regular rate and rhythm.  No murmurs, S3, S4.


RESPIRATORY: No accessory muscle use. Clear to auscultation. Breath sounds 

equal bilaterally. 


GASTROINTESTINAL: Abdomen soft, non-tender, nondistended. Hepatic and splenic 

margins not palpable. 


MUSCULOSKELETAL: Extremities without clubbing, cyanosis, or edema. No obvious 

deformities.  Full range of motion of the upper and lower extremities 

bilaterally.  2+ pulses bilaterally.


NEUROLOGICAL: Awake and alert. No obvious cranial nerve deficits.  Motor 

grossly within normal limits. Five out of 5 muscle strength in the arms and 

legs.  Normal speech.


PSYCHIATRIC: Appropriate mood and affect; insight and judgment normal.





Data


Data


Last Documented VS





Vital Signs








  Date Time  Temp Pulse Resp B/P (MAP) Pulse Ox O2 Delivery O2 Flow Rate FiO2


 


4/10/18 21:29 98.6 107 12 120/89 (99) 97   








Orders





 Orders


Complete Blood Count With Diff (4/10/18 21:42)


Comprehensive Metabolic Panel (4/10/18 21:42)


Thyroid Stimulating Hormone (4/10/18 21:42)


Psych Screen (4/10/18 21:42)


Drug Screen, Random Urine (4/10/18 21:42)


Alcohol (Ethanol) (4/10/18 21:42)


Salicylates (Aspirin) (4/10/18 21:42)


Tylenol (Acetaminophen) (4/10/18 21:42)








MDM


Medical Decision Making


Medical Screen Exam Complete:  Yes


Emergency Medical Condition:  Yes


Medical Record Reviewed:  Yes


Differential Diagnosis


Depression versus suicidal ideation versus anxiety versus adjustment disorder 

versus mood disorder versus bipolar disorder versus schizophrenia versus 

paranoid disorder versus psychosis versus substance abuse versus alcohol abuse 

versus alcohol induced psychosis versus homicidality addition versus cutting 

versus personality disorder


Narrative Course


42-year-old male that presents to the ED for evaluation of psych.  Patient was 

properly examined and was found to have signs and symptoms consistent with 

psychiatric illness.  No sign of acute medical distress.  Labs were drawn.  

Patient was medically cleared.  Okay to be seen by psych.


Mental health screening was discussed with the patient.





Diagnosis





 Primary Impression:  


 Suicidal ideation


 Additional Impression:  


 Polysubstance abuse











Obinna Burnette Apr 10, 2018 21:46

## 2018-04-11 VITALS
SYSTOLIC BLOOD PRESSURE: 131 MMHG | DIASTOLIC BLOOD PRESSURE: 65 MMHG | RESPIRATION RATE: 16 BRPM | OXYGEN SATURATION: 99 % | HEART RATE: 80 BPM

## 2018-04-11 VITALS
OXYGEN SATURATION: 97 % | SYSTOLIC BLOOD PRESSURE: 112 MMHG | TEMPERATURE: 98.4 F | HEART RATE: 93 BPM | DIASTOLIC BLOOD PRESSURE: 62 MMHG | RESPIRATION RATE: 20 BRPM

## 2018-04-11 VITALS
TEMPERATURE: 98.3 F | OXYGEN SATURATION: 98 % | RESPIRATION RATE: 18 BRPM | DIASTOLIC BLOOD PRESSURE: 61 MMHG | HEART RATE: 89 BPM | SYSTOLIC BLOOD PRESSURE: 115 MMHG

## 2018-04-11 NOTE — PD
Physical Exam


Time Seen by Provider:  16:06


Narrative


Dr. Woods has evaluated the patient, lifted the Baker act and cleared the 

patient for discharge.





Data


Data


Last Documented VS





Vital Signs








  Date Time  Temp Pulse Resp B/P (MAP) Pulse Ox O2 Delivery O2 Flow Rate FiO2


 


4/11/18 16:05 98.3 89 18 115/61 (79) 98 Room Air  








Orders





 Orders


Complete Blood Count With Diff (4/10/18 21:42)


Comprehensive Metabolic Panel (4/10/18 21:42)


Thyroid Stimulating Hormone (4/10/18 21:42)


Psych Screen (4/10/18 21:42)


Drug Screen, Random Urine (4/10/18 21:42)


Alcohol (Ethanol) (4/10/18 21:42)


Salicylates (Aspirin) (4/10/18 21:42)


Tylenol (Acetaminophen) (4/10/18 21:42)


Diet Regular Basic (4/11/18 Breakfast)


Alcohol Withdrawal Asmt-Ciwa Q4HX18 (4/11/18 14:52)


Flumazenil Inj (Romazicon Inj) (4/11/18 15:00)


Lorazepam (Ativan) (4/11/18 15:00)


Lorazepam Inj (Ativan Inj) (4/11/18 15:00)


Lorazepam (Ativan) (4/11/18 15:00)


Lorazepam Inj (Ativan Inj) (4/11/18 15:00)


Diet Regular Basic (4/11/18 Dinner)





Labs





Laboratory Tests








Test


  4/10/18


21:45


 


White Blood Count 4.4 TH/MM3 


 


Red Blood Count 4.72 MIL/MM3 


 


Hemoglobin 14.7 GM/DL 


 


Hematocrit 41.7 % 


 


Mean Corpuscular Volume 88.4 FL 


 


Mean Corpuscular Hemoglobin 31.0 PG 


 


Mean Corpuscular Hemoglobin


Concent 35.1 % 


 


 


Red Cell Distribution Width 12.6 % 


 


Platelet Count 181 TH/MM3 


 


Mean Platelet Volume 8.2 FL 


 


Neutrophils (%) (Auto) 56.9 % 


 


Lymphocytes (%) (Auto) 29.1 % 


 


Monocytes (%) (Auto) 12.6 % 


 


Eosinophils (%) (Auto) 0.1 % 


 


Basophils (%) (Auto) 1.3 % 


 


Neutrophils # (Auto) 2.5 TH/MM3 


 


Lymphocytes # (Auto) 1.3 TH/MM3 


 


Monocytes # (Auto) 0.6 TH/MM3 


 


Eosinophils # (Auto) 0.0 TH/MM3 


 


Basophils # (Auto) 0.1 TH/MM3 


 


CBC Comment DIFF FINAL 


 


Differential Comment  


 


Blood Urea Nitrogen 12 MG/DL 


 


Creatinine 0.91 MG/DL 


 


Random Glucose 88 MG/DL 


 


Total Protein 8.9 GM/DL 


 


Albumin 4.0 GM/DL 


 


Calcium Level 8.5 MG/DL 


 


Alkaline Phosphatase 132 U/L 


 


Aspartate Amino Transf


(AST/SGOT) 174 U/L 


 


 


Alanine Aminotransferase


(ALT/SGPT) 132 U/L 


 


 


Total Bilirubin 0.3 MG/DL 


 


Sodium Level 143 MEQ/L 


 


Potassium Level 3.4 MEQ/L 


 


Chloride Level 106 MEQ/L 


 


Carbon Dioxide Level 26.0 MEQ/L 


 


Anion Gap 11 MEQ/L 


 


Estimat Glomerular Filtration


Rate 92 ML/MIN 


 


 


Thyroid Stimulating Hormone


3rd Gen 1.640 uIU/ML 


 


 


Salicylates Level 2.3 MG/DL 


 


Urine Opiates Screen NEG 


 


Acetaminophen Level


  LESS THAN 2.0


MCG/ML


 


Urine Barbiturates Screen NEG 


 


Urine Amphetamines Screen NEG 


 


Urine Benzodiazepines Screen NEG 


 


Urine Cocaine Screen POS 


 


Urine Cannabinoids Screen NEG 


 


Ethyl Alcohol Level 271 MG/DL 











MDM


Supervised Visit with KURTIS:  No


Narrative Course


Dr. Woods has evaluated the patient, lifted the Baker act and cleared the 

patient for discharge. Patient contracts safety.  Denies suicidal or homicidal 

ideations.  Patient will be provided community resource packet to [facility] for 

follow-up.  Has friends and family for support.  Patient was medically cleared 

by alternate provider prior to psych screening.  Patient has been evaluated by 

psychiatry and and is now cleared for discharge.


Diagnosis





 Primary Impression:  


 Suicidal ideation


 Additional Impression:  


 Polysubstance abuse


Referrals:  


[facility] (Out patient)





[facility]





Primary Care Physician





Psychiatrist





[facility]


Patient Instructions:  General Instructions, Polysubstance Abuse (ED), Suicide 

Prevention for Adults (ED)





***Additional Instruction:  


Contract safety to your self and others


Follow-up with psychiatry


Follow-up with primary care provider


Follow-up with [facility]


Return to the emergency department immediately with worsening of symptoms


***Med/Other Pt SpecificInfo:  No Change to Meds, No Meds Exist/No RX given


Disposition:  01 DISCHARGE HOME


Condition:  Stable











Tahmina Pratt Apr 11, 2018 16:08

## 2018-04-11 NOTE — PD.PSY.CON
Provisional Diagnosis


Admission Date


Date of consultation 4/11/2018


Axis I.


1.  Polysubstance abuse


2.  Malingering psychiatric symptoms to obtain detoxification services


Axis II.


Deferred





History of Present Illness


Service


Psychiatry


Consult Requested By


Emergency department


Reason for Consult


Burnett act


Primary Care Physician


No Primary Care Physician


HPI


Mr. Bolaños is a 42-year-old  male with a reported history of depression 

and substance use issues who presents under a Baker act by law enforcement 

alleging that the patient walked up to the officer and said that he wanted to 

kill himself.  Reviewing the electronic medical record, I note that the patient 

was seen by the psychiatric nurse practitioner in 2016.  Patient's urine 

toxicology was positive for cocaine and his alcohol level was 271 on 

presentation here.





Patient seen and examined.  Chart reviewed.  Case discussed with nursing staff.

  There has been no evidence of any suicidality or homicidality while the 

patient has been under observation in the J pod.  On my examination today, the 

patient admits that he was never suicidal saying "I have just been battling 

drug and alcohol problems for years."  The patient reports that he was seeking 

detoxification services from substances.  He believes that he is beginning to 

withdraw and complains of diarrhea and feeling sweaty.  He denies any suicidal 

or homicidal ideation, intent or plan and contracts for safety.  I can elicit 

no depressive or hypomanic/manic symptoms.  He is notably future oriented.  He 

does endorse some visual phenomena of "things moving around" but has no 

auditory hallucinations, no command auditory hallucinations.  He does complain 

of some anxiety although this may be withdrawal related.  I can elicit no 

delusional material.  There is no evidence of any impairment in reality 

construction.  Remainder of the psychiatric ROS is negative.  Patient has no 

other physical complaints.  He continues to desire detoxification from 

substances.





Past psychiatric history: The patient reports a history of depression.  He is 

not currently under the care of a psychiatrist.  He denies a history of 

psychiatric admissions or suicide attempts.  He denies a history of violent 

behavior.





Family history: The patient denies a family history of serious mental illness.  

He does report that his brother completed suicide after he was rejected by the 

[employer].  His father struggled with alcohol use issues.





Chemical dependency history: The patient reports that he drinks 1/5 to a liter 

of liquor daily.  He does have a history of DTs and seizures.  Patient has also 

been using cocaine.





Social history: Patient is homeless.  He is high school educated.  He was fired 

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  He is 

single with no children.  He denies access to guns or firearms.





Review of Systems


Except as stated in HPI:  all other systems reviewed are Neg





Past Family Social History


Coded Allergies:  


     Sulfa (Sulfonamide Antibiotics) (Unverified  Allergy, Severe, Rash, 10/30/

17)


 Per pt.


Uncoded Allergies:  


     Dust Mites (Allergy, Unknown, 3/1/16)


 Per pt.


Past Medical History


Patient denies any past medical history


Active Scripts


Mupirocin Topical (Mupirocin Topical) 2 % Oint, 1 APPLIC TOPICAL BID for Mgmt 

Bacterial Infection, #22 GM 0 Refills


   Prov:Bonita Bianchi         10/30/17


Clindamycin (Cleocin) 150 Mg Cap, 300 MG PO Q6H for Infection for 10 Days, #80 

CAP 0 Refills


   Prov:Renetta Samuel DO         10/24/17


Patient's Strengths (min. 2)


Attending to basic needs.  Verbally fluent.





Physical Exam


Physical examination completed by ED provider.  On my examination today, the 

patient appears to be in no acute physical distress.  I cannot appreciate any 

tremor, no diaphoresis or mydriasis noted.  The patient does have subjective 

withdrawal symptoms as noted above.  No other motor abnormalities noted.  Labs 

and vitals reviewed:


Vital Signs





Vital Signs








  Date Time  Temp Pulse Resp B/P (MAP) Pulse Ox O2 Delivery O2 Flow Rate FiO2


 


4/11/18 13:42  80 16 131/65 (87) 99 Room Air  


 


4/11/18 07:00 98.4       














I/O   


 


 4/11/18 4/11/18 4/12/18





 08:00 16:00 00:00


 


Intake Total 1000 ml 200 ml 


 


Balance 1000 ml 200 ml 








Lab Results











Test


  4/10/18


21:45


 


White Blood Count 4.4 TH/MM3 


 


Red Blood Count 4.72 MIL/MM3 


 


Hemoglobin 14.7 GM/DL 


 


Hematocrit 41.7 % 


 


Mean Corpuscular Volume 88.4 FL 


 


Mean Corpuscular Hemoglobin 31.0 PG 


 


Mean Corpuscular Hemoglobin


Concent 35.1 % 


 


 


Red Cell Distribution Width 12.6 % 


 


Platelet Count 181 TH/MM3 


 


Mean Platelet Volume 8.2 FL 


 


Neutrophils (%) (Auto) 56.9 % 


 


Lymphocytes (%) (Auto) 29.1 % 


 


Monocytes (%) (Auto) 12.6 % 


 


Eosinophils (%) (Auto) 0.1 % 


 


Basophils (%) (Auto) 1.3 % 


 


Neutrophils # (Auto) 2.5 TH/MM3 


 


Lymphocytes # (Auto) 1.3 TH/MM3 


 


Monocytes # (Auto) 0.6 TH/MM3 


 


Eosinophils # (Auto) 0.0 TH/MM3 


 


Basophils # (Auto) 0.1 TH/MM3 


 


CBC Comment DIFF FINAL 


 


Differential Comment  


 


Blood Urea Nitrogen 12 MG/DL 


 


Creatinine 0.91 MG/DL 


 


Random Glucose 88 MG/DL 


 


Total Protein 8.9 GM/DL 


 


Albumin 4.0 GM/DL 


 


Calcium Level 8.5 MG/DL 


 


Alkaline Phosphatase 132 U/L 


 


Aspartate Amino Transf


(AST/SGOT) 174 U/L 


 


 


Alanine Aminotransferase


(ALT/SGPT) 132 U/L 


 


 


Total Bilirubin 0.3 MG/DL 


 


Sodium Level 143 MEQ/L 


 


Potassium Level 3.4 MEQ/L 


 


Chloride Level 106 MEQ/L 


 


Carbon Dioxide Level 26.0 MEQ/L 


 


Anion Gap 11 MEQ/L 


 


Estimat Glomerular Filtration


Rate 92 ML/MIN 


 


 


Thyroid Stimulating Hormone


3rd Gen 1.640 uIU/ML 


 


 


Salicylates Level 2.3 MG/DL 


 


Urine Opiates Screen NEG 


 


Acetaminophen Level


  LESS THAN 2.0


MCG/ML


 


Urine Barbiturates Screen NEG 


 


Urine Amphetamines Screen NEG 


 


Urine Benzodiazepines Screen NEG 


 


Urine Cocaine Screen POS 


 


Urine Cannabinoids Screen NEG 


 


Ethyl Alcohol Level 271 MG/DL 











Mental Status Examination


Appearance:  Other (Fair grooming.  Maintaining basic hygiene.)


Consciousness:  Alert


Orientation:  x4


Motor Activity:  Other (Motor exam as above)


Speech:  Unremarkable


Language:  Adequate


Fund of Knowledge:  Adequate


Attention and Concentration:  Adequate


Memory:  Unremarkable


Mood:  Appropriate


Affect:  Appropriate


Thought Process & Associations:  Intact, Logical, Goal directed, Linear


Thought Content:  Appropriate


Hallucination Type:  None


Delusion Type:  None


Suicidal Ideation:  No


Suicidal Plan:  No


Suicidal Intention:  No


Homicidal Ideation:  No


Homicidal Plan:  No


Homicidal Intention:  No


Mental Status Exam Remarks


Insight and judgment are fair





Assessment & Plan


Problem List:  


(1) Polysubstance abuse


ICD Codes:  F19.10 - Other psychoactive substance abuse, uncomplicated


Status:  Acute


Assessment & Plan


42-year-old male with psychiatric history as detailed above who presents under 

Baker act.  Patient admits that he malingered psychiatric symptoms to obtain 

detoxification services.  He continues to desire detoxification from 

substances.  He denies any suicidal or homicidal ideation.  I can detect no 

unstable mental illness has defined under the Baker act.  There is no evidence 

of severe self-care deficit.  The patient does have substance use issues.  

Synthesizing this information, I  that the patient does not presently meet 

the Baker act criteria.  I have lifted the Burnett act.  I have instructed the 

nurse to endeavor to transfer the patient for detoxification services.  If we 

are unable to find a detox facility willing to accept the patient, I have 

recommended that he try to seek detox services directly from [facility].  

I have supported the patient in his desire for sobriety from substances.  I 

have counseled the patient regarding warning signs for need to return to the 

psychiatric emergency room as part of a general safety plan.  I did add a CIWA 

scale with Ativan for the management of any withdrawal.  Patient is otherwise 

psychiatrically clear for discharge from the ED.





Thank you very much for this consultation.


Request HC Surrog/Guard Advoc?:  No











Arvind Varner MD Apr 11, 2018 14:52